# Patient Record
Sex: FEMALE | Race: WHITE | Employment: FULL TIME | URBAN - NONMETROPOLITAN AREA
[De-identification: names, ages, dates, MRNs, and addresses within clinical notes are randomized per-mention and may not be internally consistent; named-entity substitution may affect disease eponyms.]

---

## 2019-09-17 ENCOUNTER — HOSPITAL ENCOUNTER (EMERGENCY)
Age: 59
Discharge: HOME | End: 2019-09-17
Payer: COMMERCIAL

## 2019-09-17 VITALS
SYSTOLIC BLOOD PRESSURE: 125 MMHG | HEIGHT: 66 IN | TEMPERATURE: 97.6 F | HEART RATE: 86 BPM | BODY MASS INDEX: 35.36 KG/M2 | OXYGEN SATURATION: 99 % | WEIGHT: 220 LBS | DIASTOLIC BLOOD PRESSURE: 82 MMHG | RESPIRATION RATE: 16 BRPM

## 2019-09-17 DIAGNOSIS — J01.90 ACUTE VIRAL SINUSITIS: Primary | ICD-10-CM

## 2019-09-17 DIAGNOSIS — B97.89 ACUTE VIRAL SINUSITIS: Primary | ICD-10-CM

## 2019-09-17 DIAGNOSIS — H61.21 IMPACTED CERUMEN OF RIGHT EAR: ICD-10-CM

## 2019-09-17 PROCEDURE — 99203 OFFICE O/P NEW LOW 30 MIN: CPT

## 2019-09-17 PROCEDURE — 96372 THER/PROPH/DIAG INJ SC/IM: CPT

## 2019-09-17 PROCEDURE — 99213 OFFICE O/P EST LOW 20 MIN: CPT | Performed by: NURSE PRACTITIONER

## 2019-09-17 PROCEDURE — 2709999900 HC NON-CHARGEABLE SUPPLY

## 2019-09-17 PROCEDURE — 6360000002 HC RX W HCPCS: Performed by: NURSE PRACTITIONER

## 2019-09-17 RX ORDER — DEXTROMETHORPHAN HYDROBROMIDE AND PROMETHAZINE HYDROCHLORIDE 15; 6.25 MG/5ML; MG/5ML
5 SYRUP ORAL NIGHTLY PRN
Qty: 50 ML | Refills: 0 | Status: SHIPPED | OUTPATIENT
Start: 2019-09-17 | End: 2019-09-27

## 2019-09-17 RX ORDER — PSEUDOEPHEDRINE HYDROCHLORIDE 30 MG/1
30 TABLET ORAL EVERY 6 HOURS PRN
Qty: 20 TABLET | Refills: 0 | COMMUNITY
Start: 2019-09-17 | End: 2019-09-20

## 2019-09-17 RX ORDER — METHYLPREDNISOLONE SODIUM SUCCINATE 125 MG/2ML
125 INJECTION, POWDER, LYOPHILIZED, FOR SOLUTION INTRAMUSCULAR; INTRAVENOUS ONCE
Status: COMPLETED | OUTPATIENT
Start: 2019-09-17 | End: 2019-09-17

## 2019-09-17 RX ORDER — CEPHALEXIN 250 MG/1
250 CAPSULE ORAL 4 TIMES DAILY
COMMUNITY

## 2019-09-17 RX ORDER — AZELASTINE 1 MG/ML
1 SPRAY, METERED NASAL 2 TIMES DAILY
Qty: 1 BOTTLE | Refills: 0 | Status: SHIPPED | OUTPATIENT
Start: 2019-09-17 | End: 2019-10-01

## 2019-09-17 RX ADMIN — METHYLPREDNISOLONE SODIUM SUCCINATE 125 MG: 125 INJECTION, POWDER, FOR SOLUTION INTRAMUSCULAR; INTRAVENOUS at 09:17

## 2019-09-17 ASSESSMENT — ENCOUNTER SYMPTOMS
WHEEZING: 0
CHOKING: 0
SHORTNESS OF BREATH: 0
SWOLLEN GLANDS: 0
SINUS PRESSURE: 1
NAUSEA: 0
SORE THROAT: 1
RHINORRHEA: 1
COUGH: 1
VOMITING: 0
ABDOMINAL PAIN: 0
APNEA: 0
STRIDOR: 0
BLOOD IN STOOL: 0
SINUS PAIN: 1
CHEST TIGHTNESS: 1
CONSTIPATION: 0

## 2019-09-17 NOTE — ED TRIAGE NOTES
Patient ambulated to room with complaint of nasal congestion, cough and ear plugged that started Sunday

## 2022-05-10 ENCOUNTER — APPOINTMENT (OUTPATIENT)
Dept: GENERAL RADIOLOGY | Age: 62
End: 2022-05-10
Payer: COMMERCIAL

## 2022-05-10 ENCOUNTER — HOSPITAL ENCOUNTER (EMERGENCY)
Age: 62
Discharge: HOME | End: 2022-05-10
Attending: EMERGENCY MEDICINE
Payer: COMMERCIAL

## 2022-05-10 VITALS
HEART RATE: 96 BPM | TEMPERATURE: 98.1 F | RESPIRATION RATE: 20 BRPM | DIASTOLIC BLOOD PRESSURE: 87 MMHG | OXYGEN SATURATION: 99 % | SYSTOLIC BLOOD PRESSURE: 146 MMHG

## 2022-05-10 DIAGNOSIS — M23.91 INTERNAL DERANGEMENT OF RIGHT KNEE: ICD-10-CM

## 2022-05-10 DIAGNOSIS — M25.561 ACUTE PAIN OF RIGHT KNEE: Primary | ICD-10-CM

## 2022-05-10 PROCEDURE — 73564 X-RAY EXAM KNEE 4 OR MORE: CPT

## 2022-05-10 PROCEDURE — 6370000000 HC RX 637 (ALT 250 FOR IP): Performed by: EMERGENCY MEDICINE

## 2022-05-10 PROCEDURE — 6360000002 HC RX W HCPCS: Performed by: EMERGENCY MEDICINE

## 2022-05-10 PROCEDURE — 99284 EMERGENCY DEPT VISIT MOD MDM: CPT

## 2022-05-10 PROCEDURE — 96372 THER/PROPH/DIAG INJ SC/IM: CPT

## 2022-05-10 RX ORDER — KETOROLAC TROMETHAMINE 30 MG/ML
15 INJECTION, SOLUTION INTRAMUSCULAR; INTRAVENOUS ONCE
Status: COMPLETED | OUTPATIENT
Start: 2022-05-10 | End: 2022-05-10

## 2022-05-10 RX ORDER — OXYCODONE HYDROCHLORIDE AND ACETAMINOPHEN 5; 325 MG/1; MG/1
1 TABLET ORAL EVERY 8 HOURS PRN
Qty: 6 TABLET | Refills: 0 | Status: SHIPPED | OUTPATIENT
Start: 2022-05-10 | End: 2022-05-12

## 2022-05-10 RX ORDER — NAPROXEN 500 MG/1
500 TABLET ORAL 2 TIMES DAILY WITH MEALS
Qty: 20 TABLET | Refills: 0 | Status: SHIPPED | OUTPATIENT
Start: 2022-05-10

## 2022-05-10 RX ORDER — OXYCODONE HYDROCHLORIDE AND ACETAMINOPHEN 5; 325 MG/1; MG/1
1 TABLET ORAL ONCE
Status: COMPLETED | OUTPATIENT
Start: 2022-05-10 | End: 2022-05-10

## 2022-05-10 RX ADMIN — KETOROLAC TROMETHAMINE 15 MG: 30 INJECTION, SOLUTION INTRAMUSCULAR at 08:03

## 2022-05-10 RX ADMIN — OXYCODONE HYDROCHLORIDE AND ACETAMINOPHEN 1 TABLET: 5; 325 TABLET ORAL at 08:03

## 2022-05-10 ASSESSMENT — PAIN SCALES - GENERAL: PAINLEVEL_OUTOF10: 10

## 2022-05-10 NOTE — ED NOTES
Patient to ED for right knee pain. Patient states she injured knee 6 weeks ago. Patient was seen by her PCP.  Saturday patient states her leg gave out on her and now it is hard to bear weight      Ermelinda Baker RN  05/10/22 8106

## 2022-05-10 NOTE — ED PROVIDER NOTES
Jc ENCOUNTER          Pt Name: Dylan Sandhu  MRN: [id number]  [place] 1960  Date of evaluation: 5/10/2022  Emergency Physician: Maci Newman, [facility]       Chief Complaint   Patient presents with    Knee Pain     right     History obtained from the patient. HISTORY OF PRESENT ILLNESS    HPI  Dylan Sandhu is a 62 y.o. female who presents to the emergency department for evaluation of knee pain. Pain is localized to the right knee. Pain is associated with swelling. Pain has been ongoing for the last 2 months. Patient states initially she was walking felt her right knee twist and then had difficulty ambulating. She followed with her orthopedist received a joint injection and her knee had been feeling better. She reports then 4 days ago she was assisting with her graduation. Walked down an incline felt her right knee twist and felt like it was going to give out on her. He states she has been having worsening pain rated 10 out of 10 since that time. The patient has no other acute complaints at this time. REVIEW OF SYSTEMS   Review of Systems   Constitutional: Negative for chills and fever. Genitourinary: Negative for dysuria and frequency. Musculoskeletal: Positive for arthralgias, gait problem and joint swelling. Negative for myalgias. Skin: Negative for rash and wound. PAST MEDICAL AND SURGICAL HISTORY     Past Medical History:   Diagnosis Date    Hyperlipidemia     Hypertension      Past Surgical History:   Procedure Laterality Date    BREAST SURGERY      CHOLECYSTECTOMY      HYSTERECTOMY           MEDICATIONS   No current facility-administered medications for this encounter.     Current Outpatient Medications:     cephALEXin (KEFLEX) 250 MG capsule, Take 250 mg by mouth 4 times daily, Disp: , Rfl:     AMLODIPINE BENZOATE PO, Take by mouth, Disp: , Rfl:     HYDROCHLOROTHIAZIDE PO, Take by mouth, Disp: , Rfl:     Probiotic Product (PROBIOTIC-10 PO), Take by mouth, Disp: , Rfl:     azelastine (ASTELIN) 0.1 % nasal spray, 1 spray by Nasal route 2 times daily for 14 days Use in each nostril as directed, Disp: 1 Bottle, Rfl: 0    aspirin 325 MG tablet, Take 325 mg by mouth daily. , Disp: , Rfl:     valsartan (DIOVAN) 40 MG tablet, Take 40 mg by mouth daily. , Disp: , Rfl:     atorvastatin (LIPITOR) 10 MG tablet, Take 10 mg by mouth daily. , Disp: , Rfl:     cetirizine (ZYRTEC) 10 MG tablet, Take 10 mg by mouth daily. , Disp: , Rfl:     diphenhydrAMINE (BENADRYL) 25 MG capsule, 2 capsules  every 6-8 hours for swelling or difficulty of swallowing, Disp: , Rfl:       SOCIAL HISTORY     Social History     Social History Narrative    Not on file     Social History     Tobacco Use    Smoking status: Never Smoker    Smokeless tobacco: Never Used   Substance Use Topics    Alcohol use: Yes     Comment: socially    Drug use: No         ALLERGIES     Allergies   Allergen Reactions    Vicodin [Hydrocodone-Acetaminophen] Hives         FAMILY HISTORY     Family History   Problem Relation Age of Onset    Heart Disease Father          PHYSICAL EXAM     ED Triage Vitals   BP Temp Temp Source Pulse Resp SpO2 Height Weight   05/10/22 0747 05/10/22 0745 05/10/22 0745 05/10/22 0745 05/10/22 0745 05/10/22 0745 -- --   (!) 146/87 98.1 °F (36.7 °C) Oral 96 20 99 %           Additional Vital Signs:  Vitals:    05/10/22 0747   BP: (!) 146/87   Pulse:    Resp:    Temp:    SpO2:        Physical Exam  Vitals and nursing note reviewed. Constitutional:       General: She is not in acute distress. Appearance: She is not ill-appearing. HENT:      Head: Normocephalic and atraumatic. Nose: No congestion or rhinorrhea. Mouth/Throat:      Mouth: Mucous membranes are moist.   Cardiovascular:      Rate and Rhythm: Normal rate and regular rhythm. Pulses: Normal pulses.       Heart sounds: Normal heart sounds. Pulmonary:      Effort: Pulmonary effort is normal.      Breath sounds: Normal breath sounds. Musculoskeletal:         General: Swelling and tenderness present. No deformity. Right upper leg: Normal.      Left upper leg: Normal.      Right knee: Swelling present. No effusion, erythema, ecchymosis or bony tenderness. Normal range of motion. Tenderness present over the medial joint line. ACL laxity present. Normal alignment and normal meniscus. Instability Tests: Anterior drawer test positive. Posterior drawer test positive. Left knee: Normal.      Right lower leg: Normal.      Left lower leg: Normal.   Neurological:      Mental Status: She is alert. Psychiatric:         Attention and Perception: Attention normal.         Mood and Affect: Affect normal.         Initial vital signs and nursing assessment reviewed and abnormal from Elevated blood pressure reading. .   Pulsoximetry is normal per my interpretation. MEDICAL DECISION MAKING   Initial Assessment: Given the patient's above chief complaint and findings on history and physical examination, I thought it was appropriate to consider the following emergency medical conditions: Internal derangement right knee, meniscus tear, septic arthritis, osteoarthritis, knee sprain although some of these diagnoses are unlikely they were considered in my medical decision making. Plan: Right knee X-ray. Symptomatic treatment with analgesia, knee brace, Orthopedic referral        ED RESULTS   Laboratory results:  Labs Reviewed - No data to display    Radiologic studies results:  XR KNEE RIGHT (MIN 4 VIEWS)   Final Result      1. Degenerative change involving the patellofemoral and to lesser extent medial lateral joint compartments. 2. No fracture or other bony abnormality noted. **This report has been created using voice recognition software. It may contain minor errors which are inherent in voice recognition technology. **      Final report electronically signed by DR Luis Freedman on 5/10/2022 8:41 AM          ED Medications administered this visit:   Medications   oxyCODONE-acetaminophen (PERCOCET) 5-325 MG per tablet 1 tablet (1 tablet Oral Given 5/10/22 0803)   ketorolac (TORADOL) injection 15 mg (15 mg IntraMUSCular Given 5/10/22 0803)         ED COURSE     Patient able to ambulate. Patient was given hinged knee brace. She was referred to orthopedics. I estimate there is LOW risk for COMPARTMENT SYNDROME, DEEP VENOUS THROMBOSIS, SEPTIC ARTHRITIS, TENDON OR NEUROVASCULAR INJURY, thus I consider the discharge disposition reasonable. The patient and/or family and I have discussed the diagnosis and risks, and we agree with discharging home to follow-up with their primary doctor or the referral orthopedist. We also discussed returning to the Emergency Department immediately if new or worsening symptoms occur. We have discussed the symptoms which are most concerning (e.g., changing or worsening pain, numbness, weakness) that necessitate immediate return. MEDICATION CHANGES     DISCHARGE MEDICATIONS:  Discharge Medication List as of 5/10/2022  9:10 AM      START taking these medications    Details   naproxen (NAPROSYN) 500 MG tablet Take 1 tablet by mouth 2 times daily (with meals), Disp-20 tablet, R-0Print      oxyCODONE-acetaminophen (PERCOCET) 5-325 MG per tablet Take 1 tablet by mouth every 8 hours as needed for Pain for up to 2 days. Intended supply: 3 days.  Take lowest dose possible to manage pain, Disp-6 tablet, R-0Print                  FINAL DISPOSITION     Final diagnoses:   Acute pain of right knee   Internal derangement of right knee     Condition: condition: good  Dispo: Discharge to home    PATIENT REFERRED TO:  Nina Purvis DO  [address] [facility]  [phone]    Schedule an appointment as soon as possible for a visit in 2 days        Critical Care Time   None      This transcription was electronically signed. Parts of this transcriptions may have been dictated by use of voice recognition software and electronically transcribed, and parts may have been transcribed with the assistance of an ED scribe. The transcription may contain errors not detected in proofreading.     Electronically Signed: Michael Huerta DO, 05/10/22, 8:23 AM      Michael Huerta DO  05/10/22 1744

## 2023-09-27 ENCOUNTER — HOSPITAL ENCOUNTER (EMERGENCY)
Age: 63
Discharge: HOME | End: 2023-09-27
Attending: EMERGENCY MEDICINE
Payer: COMMERCIAL

## 2023-09-27 VITALS
HEIGHT: 67 IN | OXYGEN SATURATION: 100 % | DIASTOLIC BLOOD PRESSURE: 103 MMHG | HEART RATE: 99 BPM | RESPIRATION RATE: 18 BRPM | WEIGHT: 220 LBS | SYSTOLIC BLOOD PRESSURE: 151 MMHG | TEMPERATURE: 98.3 F | BODY MASS INDEX: 34.53 KG/M2

## 2023-09-27 DIAGNOSIS — H60.391 INFECTIVE OTITIS EXTERNA OF RIGHT EAR: ICD-10-CM

## 2023-09-27 DIAGNOSIS — H61.21 IMPACTED CERUMEN OF RIGHT EAR: Primary | ICD-10-CM

## 2023-09-27 PROCEDURE — 69209 REMOVE IMPACTED EAR WAX UNI: CPT

## 2023-09-27 PROCEDURE — 99282 EMERGENCY DEPT VISIT SF MDM: CPT

## 2023-09-27 ASSESSMENT — ENCOUNTER SYMPTOMS
TROUBLE SWALLOWING: 0
SINUS PRESSURE: 1
EYE REDNESS: 0
EYE DISCHARGE: 0
COUGH: 0
NAUSEA: 0
DIARRHEA: 0
EYE ITCHING: 0
EYE PAIN: 0
RHINORRHEA: 0
CHEST TIGHTNESS: 0
ABDOMINAL PAIN: 0
VOICE CHANGE: 0
ABDOMINAL DISTENTION: 0
BACK PAIN: 0
VOMITING: 0
BLOOD IN STOOL: 0
SORE THROAT: 0
WHEEZING: 0
CHOKING: 0
SHORTNESS OF BREATH: 0
PHOTOPHOBIA: 0
CONSTIPATION: 0

## 2023-09-27 ASSESSMENT — PAIN - FUNCTIONAL ASSESSMENT: PAIN_FUNCTIONAL_ASSESSMENT: NONE - DENIES PAIN

## 2023-09-27 NOTE — DISCHARGE INSTRUCTIONS
Patient has what appears to be a cerumen impaction and she also has what appears to be a mild otitis externa. Patient is instructed to use the eardrops as prescribed. She has Debrox at home she is instructed to use that as directed. She is instructed to follow-up with her primary care physician and do so within the next 1 to 2 days. She is instructed return to the nearest emergency room immediately for any new or worsening complaints.

## 2023-09-27 NOTE — ED NOTES
Attempted to irrigate pt's eat at this time without success. Pt is b=very anxious and feel like she is going to pass out. Pt given cold rag. Pt does not want to attempt more irrigation but is willing to let Dr. Hiram Miller look and attempt irrigation.       Saul Shen RN  09/27/23 [id number]

## 2023-09-27 NOTE — ED TRIAGE NOTES
Pt arrives to ED from home with c/o dizziness, right otalgia, feeling out of sorts and anxiety  Pt reports blurred vision on arrival  Denies any balance issues, numbness or tingling or fatigue  Pt has follow up with cardiology with next week.    Pt reports mom passed away a week ago, reports extra stress and anxiety on arrival     Pt states she really thinks it is her ear pain, and a new onset headache

## 2023-09-27 NOTE — ED PROVIDER NOTES
could see enough to see that it was still pearly underneath. Patient reported improvement of her symptoms. Patient will be given a prescription for Cortisporin otic she is instructed to use that as prescribed. She has suffered from this many times, she knows she is supposed to use the Debrox at home. I instructed her that she should continue to use the Debrox. Patient is instructed to follow-up with her primary care physician and do so within the next 1 to 2 days. Patient also has an ENT which whom she will follow-up with. I discussed this extensively at bedside with the patient who understood and agreed with the plan. Patient had improvement of her symptoms. Patient can now hear out of her right ear which she could not do very well before. Patient is subsequently discharged home in stable condition. Patient has what appears to be a cerumen impaction and she also has what appears to be a mild otitis externa. Patient is instructed to use the eardrops as prescribed. She has Debrox at home she is instructed to use that as directed. She is instructed to follow-up with her primary care physician and do so within the next 1 to 2 days. She is instructed return to the nearest emergency room immediately for any new or worsening complaints. CRITICAL CARE:   None    CONSULTS:  None    PROCEDURES:  None    FINAL IMPRESSION      1. Impacted cerumen of right ear    2.  Infective otitis externa of right ear          DISPOSITION/PLAN   Discharge    PATIENT REFERRED TO:  Your primary care physician    Call in 2 days        DISCHARGE MEDICATIONS:  New Prescriptions    No medications on file       (Please note that portions of this note were completed with a voice recognition program.  Efforts were made to edit the dictations but occasionally words are mis-transcribed.)    Carlos Weaver, [address] [place] [address] [address] [facility], DO  09/27/23 2003